# Patient Record
Sex: MALE | Race: WHITE | URBAN - METROPOLITAN AREA
[De-identification: names, ages, dates, MRNs, and addresses within clinical notes are randomized per-mention and may not be internally consistent; named-entity substitution may affect disease eponyms.]

---

## 2018-11-19 ENCOUNTER — HOSPITAL ENCOUNTER (OUTPATIENT)
Dept: LAB | Age: 43
Discharge: HOME | End: 2018-11-19
Payer: OTHER GOVERNMENT

## 2018-11-19 ENCOUNTER — OFFICE VISIT (OUTPATIENT)
Dept: FAMILY MEDICINE CLINIC | Age: 43
End: 2018-11-19

## 2018-11-19 VITALS
TEMPERATURE: 97.5 F | SYSTOLIC BLOOD PRESSURE: 126 MMHG | WEIGHT: 163.4 LBS | HEIGHT: 67 IN | OXYGEN SATURATION: 98 % | BODY MASS INDEX: 25.65 KG/M2 | RESPIRATION RATE: 16 BRPM | DIASTOLIC BLOOD PRESSURE: 72 MMHG | HEART RATE: 70 BPM

## 2018-11-19 DIAGNOSIS — R53.83 FATIGUE, UNSPECIFIED TYPE: ICD-10-CM

## 2018-11-19 DIAGNOSIS — I10 ESSENTIAL HYPERTENSION: ICD-10-CM

## 2018-11-19 DIAGNOSIS — I10 ESSENTIAL HYPERTENSION: Primary | ICD-10-CM

## 2018-11-19 LAB
ALBUMIN SERPL-MCNC: 4.8 G/DL (ref 3.4–5)
ALBUMIN/GLOB SERPL: 1.5 {RATIO} (ref 0.8–1.7)
ALP SERPL-CCNC: 92 U/L (ref 45–117)
ALT SERPL-CCNC: 41 U/L (ref 16–61)
ANION GAP SERPL CALC-SCNC: 10 MMOL/L (ref 3–18)
AST SERPL-CCNC: 24 U/L (ref 15–37)
BASOPHILS # BLD: 0 K/UL (ref 0–0.1)
BASOPHILS NFR BLD: 0 % (ref 0–2)
BILIRUB SERPL-MCNC: 0.7 MG/DL (ref 0.2–1)
BUN SERPL-MCNC: 16 MG/DL (ref 7–18)
BUN/CREAT SERPL: 15 (ref 12–20)
CALCIUM SERPL-MCNC: 9.2 MG/DL (ref 8.5–10.1)
CHLORIDE SERPL-SCNC: 101 MMOL/L (ref 100–108)
CHOLEST SERPL-MCNC: 268 MG/DL
CO2 SERPL-SCNC: 27 MMOL/L (ref 21–32)
CREAT SERPL-MCNC: 1.05 MG/DL (ref 0.6–1.3)
DIFFERENTIAL METHOD BLD: ABNORMAL
EOSINOPHIL # BLD: 0.1 K/UL (ref 0–0.4)
EOSINOPHIL NFR BLD: 1 % (ref 0–5)
ERYTHROCYTE [DISTWIDTH] IN BLOOD BY AUTOMATED COUNT: 12.1 % (ref 11.6–14.5)
GLOBULIN SER CALC-MCNC: 3.1 G/DL (ref 2–4)
GLUCOSE SERPL-MCNC: 73 MG/DL (ref 74–99)
HCT VFR BLD AUTO: 47.8 % (ref 36–48)
HDLC SERPL-MCNC: 63 MG/DL (ref 40–60)
HDLC SERPL: 4.3 {RATIO} (ref 0–5)
HGB BLD-MCNC: 16.3 G/DL (ref 13–16)
LDLC SERPL CALC-MCNC: 175.8 MG/DL (ref 0–100)
LIPID PROFILE,FLP: ABNORMAL
LYMPHOCYTES # BLD: 2.2 K/UL (ref 0.9–3.6)
LYMPHOCYTES NFR BLD: 20 % (ref 21–52)
MCH RBC QN AUTO: 31.5 PG (ref 24–34)
MCHC RBC AUTO-ENTMCNC: 34.1 G/DL (ref 31–37)
MCV RBC AUTO: 92.3 FL (ref 74–97)
MONOCYTES # BLD: 0.8 K/UL (ref 0.05–1.2)
MONOCYTES NFR BLD: 7 % (ref 3–10)
NEUTS SEG # BLD: 8 K/UL (ref 1.8–8)
NEUTS SEG NFR BLD: 72 % (ref 40–73)
PLATELET # BLD AUTO: 217 K/UL (ref 135–420)
PMV BLD AUTO: 9.5 FL (ref 9.2–11.8)
POTASSIUM SERPL-SCNC: 4.1 MMOL/L (ref 3.5–5.5)
PROT SERPL-MCNC: 7.9 G/DL (ref 6.4–8.2)
RBC # BLD AUTO: 5.18 M/UL (ref 4.7–5.5)
SODIUM SERPL-SCNC: 138 MMOL/L (ref 136–145)
TRIGL SERPL-MCNC: 146 MG/DL (ref ?–150)
VLDLC SERPL CALC-MCNC: 29.2 MG/DL
WBC # BLD AUTO: 11 K/UL (ref 4.6–13.2)

## 2018-11-19 PROCEDURE — 80053 COMPREHEN METABOLIC PANEL: CPT

## 2018-11-19 PROCEDURE — 36415 COLL VENOUS BLD VENIPUNCTURE: CPT

## 2018-11-19 PROCEDURE — 85025 COMPLETE CBC W/AUTO DIFF WBC: CPT

## 2018-11-19 PROCEDURE — 82306 VITAMIN D 25 HYDROXY: CPT

## 2018-11-19 PROCEDURE — 82043 UR ALBUMIN QUANTITATIVE: CPT

## 2018-11-19 PROCEDURE — 80061 LIPID PANEL: CPT

## 2018-11-19 RX ORDER — LISINOPRIL 20 MG/1
TABLET ORAL DAILY
COMMUNITY
End: 2018-11-19 | Stop reason: SDUPTHER

## 2018-11-19 RX ORDER — LISINOPRIL 20 MG/1
20 TABLET ORAL DAILY
Qty: 90 TAB | Refills: 1 | Status: SHIPPED | OUTPATIENT
Start: 2018-11-19 | End: 2019-07-19 | Stop reason: SDUPTHER

## 2018-11-19 NOTE — PROGRESS NOTES
[address] CC: EOC for HTN 
 
HPI:  
 
HTN: 
- Has been on medication since around 2013 
- Taking lisinopril as prescribed - Denies any issues or side effects from his medication 
- Occasionally checks BP at home - Reports that BP has been in 120s/70s - Exercising 3 days a week for 30-45 minutes - Trying to minimize sodium in diet - Lipids last checked on May 25, 2018. Record brought in for review. - Had a T. Chol. of 241, HDL of 57, and LDL of  
 
 
ROS: Positive items marked in RED 
CON: fever, chills, fatigue (endorses having stress and poor sleep & mood) Cardiovascular: palpitations, CP Resp: SOB, cough GI: nausea, vomiting, diarrhea, melena, hematochezia : dysuria, hematuria Past Medical History:  
Diagnosis Date  Biceps tendon rupture, right, initial encounter 2017  Dyslipidemia  Hypertension Past Surgical History:  
Procedure Laterality Date Mac Valdezkeya [address] ORTHOPAEDIC Right 2017 Tendon rupture arm [address] [facility] Family History Problem Relation Age of Onset  Diabetes Father  Hypertension Father  High Cholesterol Father Social History Socioeconomic History  Marital status:  Spouse name: Not on file  Number of children: Not on file  Years of education: Not on file  Highest education level: Not on file Social Needs  Financial resource strain: Not on file  Food insecurity - worry: Not on file  Food insecurity - inability: Not on file  Transportation needs - medical: Not on file  Transportation needs - non-medical: Not on file Occupational History  Occupation: RN  
  Comment: Occupational nurse for [employer] Tobacco Use  Smoking status: Never Smoker  Smokeless tobacco: Never Used Substance and Sexual Activity  Alcohol use: Yes Alcohol/week: 1.2 oz Types: 2 Cans of beer per week  Drug use: No  
 Sexual activity: Yes  
  Partners: Female Birth control/protection: None Other Topics Concern  Not on file Social History Narrative  Not on file No Known Allergies Current Outpatient Medications:  
  lisinopril (PRINIVIL, ZESTRIL) 20 mg tablet, Take  by mouth daily. , Disp: , Rfl:  
 
Physical Exam:  
  
/72   Pulse 70   Temp 97.5 °F (36.4 °C) (Oral)   Resp 16   Ht 5' 7\" (1.702 m)   Wt 163 lb 6.4 oz (74.1 kg)   SpO2 98%   BMI 25.59 kg/m² General:  WD, WN, NAD, conversant Eyes: sclera clear bilaterally, no discharge noted, eyelids normal in appearance HENT: NCAT Lungs: CTAB, normal respiratory effort and rate CV: RRR, no MRGs ABD: soft, non-tender, non-distended, normal bowel sounds Ext: no peripheral edema or digital cyanosis noted Skin: normal temperature, turgor, color, and texture Psych: alert and oriented to person, place and situation, normal affect Neuro: speech normal, moving all extremities, gait normal 
 
 
Assessment/Plan HTN, Well Controlled: 
- 10 year ASCVD risk calculated to be 1.9% based on lipid panel from 5/25/2018 - Will continue current BP regimen - Will check fasting lipid panel, CBC, CMP, and urine microalbumin/Cr 
- F/U in one month for fatigue Fatigue: - Likely due to poor sleep/mood and stress - Will start work up with CBC, CMP, and vitamin D level 
- Handout given on fatigue care - F/U in one month for fatigue Shruthi Vazquez MD 
11/19/2018, 1:12 PM

## 2018-11-19 NOTE — PROGRESS NOTES
Patient states: 
- \" [facility] as [facility] pharmacy\" -Needs refills for lisinopril 20 mg. 
-Flu shot done in [facility] on October. 
-He will updateT-dap vaccine that received, but he doesn't -remember when. Chief Complaint Patient presents with Erma Borja Establish Care 1. Have you been to the ER, urgent care clinic since your last visit? Hospitalized since your last visit? No 
 
2. Have you seen or consulted any other health care providers outside of the [address] [place] [address] since your last visit? Include any pap smears or colon screening. No  
 
Visit Vitals /72 Pulse 70 Temp 97.5 °F (36.4 °C) (Oral) Resp 16 Ht 5' 7\" (1.702 m) Wt 163 lb 6.4 oz (74.1 kg) SpO2 98% BMI 25.59 kg/m²

## 2018-11-19 NOTE — PATIENT INSTRUCTIONS
Fatigue: Care Instructions Your Care Instructions Fatigue is a feeling of tiredness, exhaustion, or lack of energy. You may feel fatigue because of too much or not enough activity. It can also come from stress, lack of sleep, boredom, and poor diet. Many medical problems, such as viral infections, can cause fatigue. Emotional problems, especially depression, are often the cause of fatigue. Fatigue is most often a symptom of another problem. Treatment for fatigue depends on the cause. For example, if you have fatigue because you have a certain health problem, treating this problem also treats your fatigue. If depression or anxiety is the cause, treatment may help. Follow-up care is a key part of your treatment and safety. Be sure to make and go to all appointments, and call your doctor if you are having problems. It's also a good idea to know your test results and keep a list of the medicines you take. How can you care for yourself at home? · Get regular exercise. But don't overdo it. Go back and forth between rest and exercise. · Get plenty of rest. 
· Eat a healthy diet. Do not skip meals, especially breakfast. 
· Reduce your use of caffeine, tobacco, and alcohol. Caffeine is most often found in coffee, tea, cola drinks, and chocolate. · Limit medicines that can cause fatigue. This includes tranquilizers and cold and allergy medicines. When should you call for help? Watch closely for changes in your health, and be sure to contact your doctor if: 
  · You have new symptoms such as fever or a rash.  
  · Your fatigue gets worse.  
  · You have been feeling down, depressed, or hopeless. Or you may have lost interest in things that you usually enjoy.  
  · You are not getting better as expected. Where can you learn more? Go to http://rene-raman.info/. Enter L460 in the search box to learn more about \"Fatigue: Care Instructions. \" Current as of: November 20, 2017 Content Version: 11.8 © 8008-0066 Healthwise, Incorporated. Care instructions adapted under license by [employer] (which disclaims liability or warranty for this information). If you have questions about a medical condition or this instruction, always ask your healthcare professional. [address] any warranty or liability for your use of this information.

## 2018-11-20 LAB
25(OH)D3 SERPL-MCNC: 22.8 NG/ML (ref 30–100)
CREAT UR-MCNC: 40 MG/DL (ref 30–125)
MICROALBUMIN UR-MCNC: <0.5 MG/DL (ref 0–3)
MICROALBUMIN/CREAT UR-RTO: NORMAL MG/G (ref 0–30)

## 2018-11-25 DIAGNOSIS — E55.9 VITAMIN D DEFICIENCY: Primary | ICD-10-CM

## 2018-11-25 RX ORDER — CYANOCOBALAMIN (VITAMIN B-12) 500 MCG
800 TABLET ORAL DAILY
Qty: 180 TAB | Refills: 0 | Status: SHIPPED | OUTPATIENT
Start: 2018-11-25 | End: 2018-11-26 | Stop reason: SDUPTHER

## 2018-11-26 DIAGNOSIS — E55.9 VITAMIN D DEFICIENCY: ICD-10-CM

## 2018-11-26 RX ORDER — CYANOCOBALAMIN (VITAMIN B-12) 500 MCG
800 TABLET ORAL DAILY
Qty: 180 TAB | Refills: 0 | Status: SHIPPED | OUTPATIENT
Start: 2018-11-26 | End: 2019-02-24

## 2018-12-19 ENCOUNTER — OFFICE VISIT (OUTPATIENT)
Dept: FAMILY MEDICINE CLINIC | Age: 43
End: 2018-12-19

## 2018-12-19 VITALS
TEMPERATURE: 98.4 F | BODY MASS INDEX: 25.52 KG/M2 | RESPIRATION RATE: 17 BRPM | HEIGHT: 67 IN | SYSTOLIC BLOOD PRESSURE: 120 MMHG | DIASTOLIC BLOOD PRESSURE: 79 MMHG | HEART RATE: 82 BPM | WEIGHT: 162.6 LBS

## 2018-12-19 DIAGNOSIS — E78.5 HYPERLIPIDEMIA, UNSPECIFIED HYPERLIPIDEMIA TYPE: ICD-10-CM

## 2018-12-19 DIAGNOSIS — R53.83 FATIGUE, UNSPECIFIED TYPE: Primary | ICD-10-CM

## 2018-12-19 NOTE — PROGRESS NOTES
[facility]    CC: F/U of fatigue and lab work    HPI:     -Papi Pham requested blood work   -Taking OTC vitamin D as prescribed  -Reports the fatigue has improved notably from last visit   -Currently exercising 3 days a week for 45-60 minutes  -Reports trying to eat lean meats, has increased vegetables in diet, and has reduced his sugar intake      ROS: Positive items marked in RED  CON: fever, chills  Cardiovascular: palpitations, CP  Resp: SOB, cough  GI: nausea, vomiting, diarrhea  : dysuria, hematuria      Past Medical History:   Diagnosis Date    Biceps tendon rupture, right, initial encounter 2017    Dyslipidemia     Hypertension        Past Surgical History:   Procedure Laterality Date    HX HERNIA REPAIR  1976    HX ORTHOPAEDIC Right 2017    Tendon rupture arm    HX TONSIL AND ADENOIDECTOMY  1985    HX WISDOM TEETH EXTRACTION  1992       Family History   Problem Relation Age of Onset    Diabetes Father     Hypertension Father     High Cholesterol Father        Social History     Socioeconomic History    Marital status:      Spouse name: Not on file    Number of children: Not on file    Years of education: Not on file    Highest education level: Not on file   Occupational History    Occupation: RN     Comment: Occupational nurse for [employer]   Tobacco Use    Smoking status: Never Smoker    Smokeless tobacco: Never Used   Substance and Sexual Activity    Alcohol use: Yes     Alcohol/week: 1.2 oz     Types: 2 Cans of beer per week    Drug use: No    Sexual activity: Yes     Partners: Female     Birth control/protection: None       No Known Allergies      Current Outpatient Medications:     cholecalciferol (VITAMIN D3) 400 unit tab tablet, Take 2 Tabs by mouth daily for 90 days. , Disp: 180 Tab, Rfl: 0    lisinopril (PRINIVIL, ZESTRIL) 20 mg tablet, Take 1 Tab by mouth daily. , Disp: 90 Tab, Rfl: 1    Physical Exam:      /79   Pulse 82   Temp 98.4 °F (36.9 °C) (Oral)   Resp 17   Ht 5' 7\" (1.702 m)   Wt 162 lb 9.6 oz (73.8 kg)   BMI 25.47 kg/m²     General: WD, WN, NAD, conversant  Eyes: sclera clear bilaterally, no discharge noted, eyelids normal in appearance  HENT: NCAT  Lungs: CTAB, normal respiratory effort and rate  CV: RRR, no MRGs  ABD: soft, non-tender, non-distended, normal bowel sounds  Skin: normal temperature, turgor, color, and texture  Psych: alert and oriented to person, place and situation, normal affect  Neuro: speech normal, moving all extremities, gait normal    Results for Carlos Alberto Domingo (MRN [id number]):   Ref. Range 11/19/2018 13:45   WBC Latest Ref Range: 4.6 - 13.2 K/uL 11.0   RBC Latest Ref Range: 4.70 - 5.50 M/uL 5.18   HGB Latest Ref Range: 13.0 - 16.0 g/dL 16.3 (H)   HCT Latest Ref Range: 36.0 - 48.0 % 47.8   MCV Latest Ref Range: 74.0 - 97.0 FL 92.3   MCH Latest Ref Range: 24.0 - 34.0 PG 31.5   MCHC Latest Ref Range: 31.0 - 37.0 g/dL 34.1   RDW Latest Ref Range: 11.6 - 14.5 % 12.1   PLATELET Latest Ref Range: 135 - 420 K/uL 217   MPV Latest Ref Range: 9.2 - 11.8 FL 9.5   NEUTROPHILS Latest Ref Range: 40 - 73 % 72   LYMPHOCYTES Latest Ref Range: 21 - 52 % 20 (L)   MONOCYTES Latest Ref Range: 3 - 10 % 7   EOSINOPHILS Latest Ref Range: 0 - 5 % 1   BASOPHILS Latest Ref Range: 0 - 2 % 0   DF Latest Units:   AUTOMATED   ABS. NEUTROPHILS Latest Ref Range: 1.8 - 8.0 K/UL 8.0   ABS. LYMPHOCYTES Latest Ref Range: 0.9 - 3.6 K/UL 2.2   ABS. MONOCYTES Latest Ref Range: 0.05 - 1.2 K/UL 0.8   ABS. EOSINOPHILS Latest Ref Range: 0.0 - 0.4 K/UL 0.1   ABS.  BASOPHILS Latest Ref Range: 0.0 - 0.1 K/UL 0.0   Sodium Latest Ref Range: 136 - 145 mmol/L 138   Potassium Latest Ref Range: 3.5 - 5.5 mmol/L 4.1   Chloride Latest Ref Range: 100 - 108 mmol/L 101   CO2 Latest Ref Range: 21 - 32 mmol/L 27   Anion gap Latest Ref Range: 3.0 - 18 mmol/L 10   Glucose Latest Ref Range: 74 - 99 mg/dL 73 (L)   BUN Latest Ref Range: 7.0 - 18 MG/DL 16   Creatinine Latest Ref Range: 0.6 - 1.3 MG/DL 1.05   BUN/Creatinine ratio Latest Ref Range: 12 - 20   15   Calcium Latest Ref Range: 8.5 - 10.1 MG/DL 9.2   GFR est non-AA Latest Ref Range: >60 ml/min/1.73m2 >60   GFR est AA Latest Ref Range: >60 ml/min/1.73m2 >60   Bilirubin, total Latest Ref Range: 0.2 - 1.0 MG/DL 0.7   Protein, total Latest Ref Range: 6.4 - 8.2 g/dL 7.9   Albumin Latest Ref Range: 3.4 - 5.0 g/dL 4.8   Globulin Latest Ref Range: 2.0 - 4.0 g/dL 3.1   A-G Ratio Latest Ref Range: 0.8 - 1.7   1.5   ALT (SGPT) Latest Ref Range: 16 - 61 U/L 41   AST Latest Ref Range: 15 - 37 U/L 24   Alk. phosphatase Latest Ref Range: 45 - 117 U/L 92   Triglyceride Latest Ref Range: <150 MG/   Cholesterol, total Latest Ref Range: <200 MG/ (H)   HDL Cholesterol Latest Ref Range: 40 - 60 MG/DL 63 (H)   CHOL/HDL Ratio Latest Ref Range: 0 - 5.0   4.3   LDL, calculated Latest Ref Range: 0 - 100 MG/.8 (H)   VLDL, calculated Latest Units: MG/DL 29.2   Creatinine, urine Latest Ref Range: 30 - 125 mg/dL 40.00   Microalbumin,urine random Latest Ref Range: 0 - 3.0 MG/DL <0.50   Microalbumin/Creat. Ratio Latest Ref Range: 0 - 30 mg/g Cannot calculate . ..    Vitamin D 25-Hydroxy Latest Ref Range: 30 - 100 ng/mL 22.8 (L)       Assessment/Plan     Fatigue, Improving:  -Suspect it is secondary to vitamin D deficiency  -Will continue current vitamin D regimen  -We will defer doing any further workup, given improvement since starting vitamin D  - Follow up as needed      HLD, Unchanged:  -Current 10-year ASCVD risk calculated to be 1.9%  -Calculated ASCVD risk and current AHA/ACC recommendations discussed with patient  -Will continue to manage with lifestyle changes  -Follow-up in 7 months for well visit        Leeann Bolden MD  12/19/2018, 3:34 PM

## 2018-12-19 NOTE — PROGRESS NOTES
1. Have you been to the ER, urgent care clinic since your last visit? Hospitalized since your last visit? No.     2. Have you seen or consulted any other health care providers outside of the [address] [place] [address] since your last visit? Include any pap smears or colon screening.  No.     Chief Complaint   Patient presents with    Follow Up Chronic Condition    Fatigue     Better since last visit    Results     labs

## 2018-12-19 NOTE — PATIENT INSTRUCTIONS
Learning About High Cholesterol  What is high cholesterol? Cholesterol is a type of fat in your blood. It is needed for many body functions, such as making new cells. Cholesterol is made by your body. It also comes from food you eat. If you have too much cholesterol, it starts to build up in your arteries. This is called hardening of the arteries, or atherosclerosis. High cholesterol raises your risk of a heart attack and stroke. There are different types of cholesterol. LDL is the \"bad\" cholesterol. High LDL can raise your risk for heart disease, heart attack, and stroke. HDL is the \"good\" cholesterol. High HDL is linked with a lower risk for heart disease, heart attack, and stroke. Your cholesterol levels help your doctor find out your risk for having a heart attack or stroke. How can you prevent high cholesterol? A heart-healthy lifestyle can help you prevent high cholesterol. This lifestyle helps lower your risk for a heart attack and stroke. · Eat heart-healthy foods. ? Eat fruits, vegetables, whole grains (like oatmeal), dried beans and peas, nuts and seeds, soy products (like tofu), and fat-free or low-fat dairy products. ? Replace butter, margarine, and hydrogenated or partially hydrogenated oils with olive and canola oils. (Canola oil margarine without trans fat is fine.)  ? Replace red meat with fish, poultry, and soy protein (like tofu). ? Limit processed and packaged foods like chips, crackers, and cookies. · Be active. Exercise can improve your cholesterol level. Get at least 30 minutes of exercise on most days of the week. Walking is a good choice. You also may want to do other activities, such as running, swimming, cycling, or playing tennis or team sports. · Stay at a healthy weight. Lose weight if you need to. · Don't smoke. If you need help quitting, talk to your doctor about stop-smoking programs and medicines. These can increase your chances of quitting for good.   How is high cholesterol treated? The goal of treatment is to reduce your chances of having a heart attack or stroke. The goal is not to lower your cholesterol numbers only. · You may make lifestyle changes, such as eating healthy foods, not smoking, losing weight, and being more active. · You may have to take medicine. Follow-up care is a key part of your treatment and safety. Be sure to make and go to all appointments, and call your doctor if you are having problems. It's also a good idea to know your test results and keep a list of the medicines you take. Where can you learn more? Go to http://rene-raman.info/. Enter E724 in the search box to learn more about \"Learning About High Cholesterol. \"  Current as of: December 6, 2017  Content Version: 11.8  © 0989-0119 Healthwise, Incorporated. Care instructions adapted under license by [employer] (which disclaims liability or warranty for this information). If you have questions about a medical condition or this instruction, always ask your healthcare professional. [address] any warranty or liability for your use of this information.

## 2019-07-19 ENCOUNTER — OFFICE VISIT (OUTPATIENT)
Dept: FAMILY MEDICINE CLINIC | Age: 44
End: 2019-07-19

## 2019-07-19 VITALS
WEIGHT: 167 LBS | HEART RATE: 75 BPM | TEMPERATURE: 96.8 F | DIASTOLIC BLOOD PRESSURE: 82 MMHG | HEIGHT: 67 IN | RESPIRATION RATE: 18 BRPM | SYSTOLIC BLOOD PRESSURE: 138 MMHG | BODY MASS INDEX: 26.21 KG/M2 | OXYGEN SATURATION: 98 %

## 2019-07-19 DIAGNOSIS — I10 ESSENTIAL HYPERTENSION: ICD-10-CM

## 2019-07-19 DIAGNOSIS — Z00.00 VISIT FOR WELL MAN HEALTH CHECK: Primary | ICD-10-CM

## 2019-07-19 DIAGNOSIS — E55.9 VITAMIN D DEFICIENCY: ICD-10-CM

## 2019-07-19 RX ORDER — GLUCOSAMINE SULFATE 1500 MG
POWDER IN PACKET (EA) ORAL DAILY
COMMUNITY

## 2019-07-19 RX ORDER — LISINOPRIL 20 MG/1
20 TABLET ORAL DAILY
Qty: 90 TAB | Refills: 1 | Status: SHIPPED | OUTPATIENT
Start: 2019-07-19 | End: 2020-04-27

## 2019-07-19 NOTE — LETTER
NOTIFICATION RETURN TO WORK / SCHOOL 
 
7/19/2019 3:43 PM 
 
Mr. Magnus Angelucci [address] [facility] [address] To Whom It May Concern: 
 
Magnus Angelucci is currently under the care of Misa Diez. He needs a sit to stand desk to allow him to change positions. If there are questions or concerns please have the patient contact our office. Sincerely, Ovidio Valdivia MD

## 2019-07-19 NOTE — PROGRESS NOTES
[facility]    CC: Well Man Exam    HPI:     Well Man Visit:  - 44 y.o.    - Sexual Hx: Active with wife only  - STD Hx: None  - Sun exposure: Yes. Using any protection: Sunscreen, hats, and glasses  - Vitamins or supplement: Vitamin D3  - Other melanie providers seen: Dentist  - Physical activity: Exercises 4 days a week for 30-60 minutes  - Diet: Unrestricted, but snaking less, dairy: Yes  - Caffeine intake: once a week he will drink a cup of coffee  - Safety: Wears seatbelt in car and has smoke detectors at home  - Reports he feels safe at home and in his neighborhood. Reports he feels his family is safe at home and in his neighborhood.        ROS: Positive items marked in RED  CON: fever, chills  Eyes: vision loss, eye pain  ENT: ear pain, sore throat  Cardiovascular: CP, palpitations, swelling of lower extremities  Resp: SOB, cough  Lymph/Heme: swollen/enlarged lymph nodes, tender/painful lymph nodes  GI: nausea, vomiting, diarrhea, melena, hematochezia  SKIN: rash, itching,  growths  : dysuria, hematuria, abnormal penile discharge, testicular pain/lumps/bumps  MS: arthralgias, myalgias  Neuro: numbness, weakness  Psych: depression, anxiety    Past Medical History:   Diagnosis Date    Biceps tendon rupture, right, initial encounter 2017    Dyslipidemia     Hypertension        Past Surgical History:   Procedure Laterality Date    HX HERNIA REPAIR  1976    HX ORTHOPAEDIC Right 2017    Tendon rupture arm    HX TONSIL AND ADENOIDECTOMY  1985    HX WISDOM TEETH EXTRACTION  1992       Family History   Problem Relation Age of Onset    Diabetes Father     Hypertension Father     High Cholesterol Father        Social History     Socioeconomic History    Marital status:      Spouse name: Not on file    Number of children: Not on file    Years of education: Not on file    Highest education level: Not on file   Occupational History    Occupation: RN     Comment: Occupational nurse for [employer]   Tobacco Use    Smoking status: Never Smoker    Smokeless tobacco: Never Used   Substance and Sexual Activity    Alcohol use: Yes     Alcohol/week: 2.0 standard drinks     Types: 2 Cans of beer per week    Drug use: No    Sexual activity: Yes     Partners: Female     Birth control/protection: None       No Known Allergies      Current Outpatient Medications:     cholecalciferol (VITAMIN D3) 1,000 unit cap, Take  by mouth daily. , Disp: , Rfl:     lisinopril (PRINIVIL, ZESTRIL) 20 mg tablet, Take 1 Tab by mouth daily. , Disp: 90 Tab, Rfl: 1    Physical Exam:      /82   Pulse 75   Temp 96.8 °F (36 °C) (Oral)   Resp 18   Ht 5' 7\" (1.702 m)   Wt 167 lb (75.8 kg)   SpO2 98%   BMI 26.16 kg/m²     General:  WD, WN, NAD, conversant  Eyes: sclera clear bilaterally, no discharge noted, eyelids normal in appearance  HENT: NCAT, oropharynx clear, MMM  Neck: supple, midline trachea  Lungs: CTAB, Normal respiratory effort and rate  CV: RRR, no MRGs  ABD: soft, non-tender, non-distended, normal bowel sounds  : normal appearing genitalia, no penile discharge noted, cremaster reflex intact bilaterally, no hernias  Ext: no peripheral edema or digital cyanosis noted  Skin: normal temperature, turgor, color, and texture  Psych: alert and oriented to person, place and time, normal affect  Neuro: speech normal, moving all extremities, gait normal, 5/5 strength in upper/lower extremities, CN II-XII grossly intact      Assessment/Plan     Well Man Visit:  -Patient counseled on lifestyle changes to promote BP control  -Vitamin D level, CBC, CMP, fasting lipid panel ordered  -Handouts given on well visit care, DASH diet, hypertension care, and reading food labels for sodium content  -Follow-up in 2 weeks for hypertension management        Miriam Zaman MD  7/19/2019, 3:20 PM

## 2019-07-19 NOTE — PROGRESS NOTES
1. Have you been to the ER, urgent care clinic since your last visit? Hospitalized since your last visit? No.     2. Have you seen or consulted any other health care providers outside of the [address] [place] [address] since your last visit? Include any pap smears or colon screening.  No.     Chief Complaint   Patient presents with   [facility] Wellness Visit

## 2019-07-19 NOTE — PATIENT INSTRUCTIONS
Well Visit, Ages 25 to 48: Care Instructions  Your Care Instructions    Physical exams can help you stay healthy. Your doctor has checked your overall health and may have suggested ways to take good care of yourself. He or she also may have recommended tests. At home, you can help prevent illness with healthy eating, regular exercise, and other steps. Follow-up care is a key part of your treatment and safety. Be sure to make and go to all appointments, and call your doctor if you are having problems. It's also a good idea to know your test results and keep a list of the medicines you take. How can you care for yourself at home? · Reach and stay at a healthy weight. This will lower your risk for many problems, such as obesity, diabetes, heart disease, and high blood pressure. · Get at least 30 minutes of physical activity on most days of the week. Walking is a good choice. You also may want to do other activities, such as running, swimming, cycling, or playing tennis or team sports. Discuss any changes in your exercise program with your doctor. · Do not smoke or allow others to smoke around you. If you need help quitting, talk to your doctor about stop-smoking programs and medicines. These can increase your chances of quitting for good. · Talk to your doctor about whether you have any risk factors for sexually transmitted infections (STIs). Having one sex partner (who does not have STIs and does not have sex with anyone else) is a good way to avoid these infections. · Use birth control if you do not want to have children at this time. Talk with your doctor about the choices available and what might be best for you. · Protect your skin from too much sun. When you're outdoors from 10 a.m. to 4 p.m., stay in the shade or cover up with clothing and a hat with a wide brim. Wear sunglasses that block UV rays. Even when it's cloudy, put broad-spectrum sunscreen (SPF 30 or higher) on any exposed skin.   · See a dentist one or two times a year for checkups and to have your teeth cleaned. · Wear a seat belt in the car. · Drink alcohol in moderation, if at all. That means no more than 2 drinks a day for men and 1 drink a day for women. Follow your doctor's advice about when to have certain tests. These tests can spot problems early. For everyone  · Cholesterol. Have the fat (cholesterol) in your blood tested after age 21. Your doctor will tell you how often to have this done based on your age, family history, or other things that can increase your risk for heart disease. · Blood pressure. Have your blood pressure checked during a routine doctor visit. Your doctor will tell you how often to check your blood pressure based on your age, your blood pressure results, and other factors. · Vision. Talk with your doctor about how often to have a glaucoma test.  · Diabetes. Ask your doctor whether you should have tests for diabetes. · Colon cancer. Have a test for colon cancer at age 48. You may have one of several tests. If you are younger than 48, you may need a test earlier if you have any risk factors. Risk factors include whether you already had a precancerous polyp removed from your colon or whether your parent, brother, sister, or child has had colon cancer. For women  · Breast exam and mammogram. Talk to your doctor about when you should have a clinical breast exam and a mammogram. Medical experts differ on whether and how often women under 50 should have these tests. Your doctor can help you decide what is right for you. · Pap test and pelvic exam. Begin Pap tests at age 24. A Pap test is the best way to find cervical cancer. The test often is part of a pelvic exam. Ask how often to have this test.  · Tests for sexually transmitted infections (STIs). Ask whether you should have tests for STIs. You may be at risk if you have sex with more than one person, especially if your partners do not wear condoms.   For men  · Tests for sexually transmitted infections (STIs). Ask whether you should have tests for STIs. You may be at risk if you have sex with more than one person, especially if you do not wear a condom. · Testicular cancer exam. Ask your doctor whether you should check your testicles regularly. · Prostate exam. Talk to your doctor about whether you should have a blood test (called a PSA test) for prostate cancer. Experts differ on whether and when men should have this test. Some experts suggest it if you are older than 39 and are -American or have a father or brother who got prostate cancer when he was younger than 72. When should you call for help? Watch closely for changes in your health, and be sure to contact your doctor if you have any problems or symptoms that concern you. Where can you learn more? Go to http://rene-raman.info/. Enter P072 in the search box to learn more about \"Well Visit, Ages 25 to 48: Care Instructions. \"  Current as of: March 28, 2018  Content Version: 11.9  © 7836-4265 [employer]. Care instructions adapted under license by [employer] (which disclaims liability or warranty for this information). If you have questions about a medical condition or this instruction, always ask your healthcare professional. [place] [address] any warranty or liability for your use of this information. DASH Diet: Care Instructions  Your Care Instructions    The DASH diet is an eating plan that can help lower your blood pressure. DASH stands for Dietary Approaches to Stop Hypertension. Hypertension is high blood pressure. The DASH diet focuses on eating foods that are high in calcium, potassium, and magnesium. These nutrients can lower blood pressure. The foods that are highest in these nutrients are fruits, vegetables, low-fat dairy products, nuts, seeds, and legumes.  But taking calcium, potassium, and magnesium supplements instead of eating foods that are high in those nutrients does not have the same effect. The DASH diet also includes whole grains, fish, and poultry. The DASH diet is one of several lifestyle changes your doctor may recommend to lower your high blood pressure. Your doctor may also want you to decrease the amount of sodium in your diet. Lowering sodium while following the DASH diet can lower blood pressure even further than just the DASH diet alone. Follow-up care is a key part of your treatment and safety. Be sure to make and go to all appointments, and call your doctor if you are having problems. It's also a good idea to know your test results and keep a list of the medicines you take. How can you care for yourself at home? Following the DASH diet  · Eat 4 to 5 servings of fruit each day. A serving is 1 medium-sized piece of fruit, ½ cup chopped or canned fruit, 1/4 cup dried fruit, or 4 ounces (½ cup) of fruit juice. Choose fruit more often than fruit juice. · Eat 4 to 5 servings of vegetables each day. A serving is 1 cup of lettuce or raw leafy vegetables, ½ cup of chopped or cooked vegetables, or 4 ounces (½ cup) of vegetable juice. Choose vegetables more often than vegetable juice. · Get 2 to 3 servings of low-fat and fat-free dairy each day. A serving is 8 ounces of milk, 1 cup of yogurt, or 1 ½ ounces of cheese. · Eat 6 to 8 servings of grains each day. A serving is 1 slice of bread, 1 ounce of dry cereal, or ½ cup of cooked rice, pasta, or cooked cereal. Try to choose whole-grain products as much as possible. · Limit lean meat, poultry, and fish to 2 servings each day. A serving is 3 ounces, about the size of a deck of cards. · Eat 4 to 5 servings of nuts, seeds, and legumes (cooked dried beans, lentils, and split peas) each week. A serving is 1/3 cup of nuts, 2 tablespoons of seeds, or ½ cup of cooked beans or peas. · Limit fats and oils to 2 to 3 servings each day.  A serving is 1 teaspoon of vegetable oil or 2 tablespoons of salad dressing. · Limit sweets and added sugars to 5 servings or less a week. A serving is 1 tablespoon jelly or jam, ½ cup sorbet, or 1 cup of lemonade. · Eat less than 2,300 milligrams (mg) of sodium a day. If you limit your sodium to 1,500 mg a day, you can lower your blood pressure even more. Tips for success  · Start small. Do not try to make dramatic changes to your diet all at once. You might feel that you are missing out on your favorite foods and then be more likely to not follow the plan. Make small changes, and stick with them. Once those changes become habit, add a few more changes. · Try some of the following:  ? Make it a goal to eat a fruit or vegetable at every meal and at snacks. This will make it easy to get the recommended amount of fruits and vegetables each day. ? Try yogurt topped with fruit and nuts for a snack or healthy dessert. ? Add lettuce, tomato, cucumber, and onion to sandwiches. ? Combine a ready-made pizza crust with low-fat mozzarella cheese and lots of vegetable toppings. Try using tomatoes, squash, spinach, broccoli, carrots, cauliflower, and onions. ? Have a variety of cut-up vegetables with a low-fat dip as an appetizer instead of chips and dip. ? Sprinkle sunflower seeds or chopped almonds over salads. Or try adding chopped walnuts or almonds to cooked vegetables. ? Try some vegetarian meals using beans and peas. Add garbanzo or kidney beans to salads. Make burritos and tacos with mashed sommers beans or black beans. Where can you learn more? Go to http://rene-raman.info/. Enter X608 in the search box to learn more about \"DASH Diet: Care Instructions. \"  Current as of: July 22, 2018  Content Version: 11.9  © 5455-4579 [employer]. Care instructions adapted under license by [employer] (which disclaims liability or warranty for this information).  If you have questions about a medical condition or this instruction, always ask your healthcare professional. [address] any warranty or liability for your use of this information. High Blood Pressure: Care Instructions  Overview    It's normal for blood pressure to go up and down throughout the day. But if it stays up, you have high blood pressure. Another name for high blood pressure is hypertension. Despite what a lot of people think, high blood pressure usually doesn't cause headaches or make you feel dizzy or lightheaded. It usually has no symptoms. But it does increase your risk of stroke, heart attack, and other problems. You and your doctor will talk about your risks of these problems based on your blood pressure. Your doctor will give you a goal for your blood pressure. Your goal will be based on your health and your age. Lifestyle changes, such as eating healthy and being active, are always important to help lower blood pressure. You might also take medicine to reach your blood pressure goal.  Follow-up care is a key part of your treatment and safety. Be sure to make and go to all appointments, and call your doctor if you are having problems. It's also a good idea to know your test results and keep a list of the medicines you take. How can you care for yourself at home? Medical treatment  · If you stop taking your medicine, your blood pressure will go back up. You may take one or more types of medicine to lower your blood pressure. Be safe with medicines. Take your medicine exactly as prescribed. Call your doctor if you think you are having a problem with your medicine. · Talk to your doctor before you start taking aspirin every day. Aspirin can help certain people lower their risk of a heart attack or stroke. But taking aspirin isn't right for everyone, because it can cause serious bleeding. · See your doctor regularly.  You may need to see the doctor more often at first or until your blood pressure comes down.  · If you are taking blood pressure medicine, talk to your doctor before you take decongestants or anti-inflammatory medicine, such as ibuprofen. Some of these medicines can raise blood pressure. · Learn how to check your blood pressure at home. Lifestyle changes  · Stay at a healthy weight. This is especially important if you put on weight around the waist. Losing even 10 pounds can help you lower your blood pressure. · If your doctor recommends it, get more exercise. Walking is a good choice. Bit by bit, increase the amount you walk every day. Try for at least 30 minutes on most days of the week. You also may want to swim, bike, or do other activities. · Avoid or limit alcohol. Talk to your doctor about whether you can drink any alcohol. · Try to limit how much sodium you eat to less than 2,300 milligrams (mg) a day. Your doctor may ask you to try to eat less than 1,500 mg a day. · Eat plenty of fruits (such as bananas and oranges), vegetables, legumes, whole grains, and low-fat dairy products. · Lower the amount of saturated fat in your diet. Saturated fat is found in animal products such as milk, cheese, and meat. Limiting these foods may help you lose weight and also lower your risk for heart disease. · Do not smoke. Smoking increases your risk for heart attack and stroke. If you need help quitting, talk to your doctor about stop-smoking programs and medicines. These can increase your chances of quitting for good. When should you call for help? Call 911 anytime you think you may need emergency care. This may mean having symptoms that suggest that your blood pressure is causing a serious heart or blood vessel problem. Your blood pressure may be over 180/120.   For example, call 911 if:    · You have symptoms of a heart attack. These may include:  ? Chest pain or pressure, or a strange feeling in the chest.  ? Sweating. ? Shortness of breath. ? Nausea or vomiting.   ? Pain, pressure, or a strange feeling in the back, neck, jaw, or upper belly or in one or both shoulders or arms. ? Lightheadedness or sudden weakness. ? A fast or irregular heartbeat.     · You have symptoms of a stroke. These may include:  ? Sudden numbness, tingling, weakness, or loss of movement in your face, arm, or leg, especially on only one side of your body. ? Sudden vision changes. ? Sudden trouble speaking. ? Sudden confusion or trouble understanding simple statements. ? Sudden problems with walking or balance. ? A sudden, severe headache that is different from past headaches.     · You have severe back or belly pain.    Do not wait until your blood pressure comes down on its own. Get help right away.   Call your doctor now or seek immediate care if:    · Your blood pressure is much higher than normal (such as 180/120 or higher), but you don't have symptoms.     · You think high blood pressure is causing symptoms, such as:  ? Severe headache.  ? Blurry vision.    Watch closely for changes in your health, and be sure to contact your doctor if:    · Your blood pressure measures higher than your doctor recommends at least 2 times. That means the top number is higher or the bottom number is higher, or both.     · You think you may be having side effects from your blood pressure medicine. Where can you learn more? Go to http://rene-raman.info/. Enter Z577 in the search box to learn more about \"High Blood Pressure: Care Instructions. \"  Current as of: July 22, 2018  Content Version: 11.9  © 7293-0334 [employer]. Care instructions adapted under license by [employer] (which disclaims liability or warranty for this information). If you have questions about a medical condition or this instruction, always ask your healthcare professional. [place] [address] any warranty or liability for your use of this information.          How to Read a Food Label to Limit Sodium: Care Instructions  Your Care Instructions  Sodium causes your body to hold on to extra water. This can raise your blood pressure and force your heart and kidneys to work harder. In very serious cases, this could cause you to be put in the hospital. It might even be life-threatening. By limiting sodium, you will feel better and lower your risk of serious problems. Processed foods, fast food, and restaurant foods are the major sources of dietary sodium. The most common name for sodium is salt. Try to limit how much sodium you eat to less than 2,300 milligrams (mg) a day. If you limit your sodium to 1,500 mg a day, you can lower your blood pressure even more. This limit counts all the salt that you eat in foods you cook or in packaged foods. Keep a list of everything you eat and drink. Follow-up care is a key part of your treatment and safety. Be sure to make and go to all appointments, and call your doctor if you are having problems. It's also a good idea to know your test results and keep a list of the medicines you take. How can you care for yourself at home? Read ingredient lists on food labels  · Read the list of ingredients on food labels to help you find how much sodium is in a food. The label lists the ingredients in a food in descending order (from the most to the least). If salt or sodium is high on the list, there may be a lot of sodium in the food. · Know that sodium has different names. Sodium is also called monosodium glutamate (MSG, common in [facility] food), sodium citrate, sodium alginate, sodium hydroxide, and sodium phosphate. Read Nutrition Facts labels  · On most foods, there is a Nutrition Facts label. This will tell you how much sodium is in one serving of food. Look at both the serving size and the sodium amount. The serving size is located at the top of the label, usually right under the \"Nutrition Facts\" title. The amount of sodium is given in the list under the title.  It is given in milligrams (mg).  ? Check the serving size carefully. A single serving is often very small, and you may eat more than one serving. If this is the case, you will eat more sodium than listed on the label. For example, if the serving size for a canned soup is 1 cup and the sodium amount is 470 mg, if you have 2 cups you will eat 940 mg of sodium. · The nutrition facts for fresh fruits and vegetables are not listed on the food. They may be listed somewhere in the store. These foods usually have no sodium or low sodium. · The Nutrition Facts label also gives you the Percent Daily Value for sodium. This is how much of the recommended amount of sodium a serving contains. The daily value for sodium is less than 2,300 mg. So if the Percent Daily Value says 50%, this means one serving is giving you half of this, or 1,150 mg. Buy low-sodium foods  · Look for foods that are made with less sodium. Watch for the following words on the label. ? \"Unsalted\" means there is no sodium added to the food. But there may be sodium already in the food naturally. ? \"Sodium-free\" means a serving has less than 5 mg of sodium. ? \"Very low sodium\" means a serving has 35 mg or less of sodium. ? \"Low-sodium\" means a serving has 140 mg or less of sodium. · \"Reduced-sodium\" means that there is 25% less sodium than what the food normally has. This is still usually too much sodium. Try not to buy foods with this on the label. · Buy fresh vegetables, or frozen vegetables without added sauces. Buy low-sodium versions of canned vegetables, soups, and other canned goods. Where can you learn more? Go to http://rene-raman.info/. Enter 26 606180 in the search box to learn more about \"How to Read a Food Label to Limit Sodium: Care Instructions. \"  Current as of: March 28, 2018  Content Version: 11.9  © 9715-1324 [employer].  Care instructions adapted under license by [employer] (which disclaims liability or warranty for this information). If you have questions about a medical condition or this instruction, always ask your healthcare professional. [place] [address] any warranty or liability for your use of this information.

## 2019-07-25 LAB
25(OH)D3+25(OH)D2 SERPL-MCNC: 33.7 NG/ML (ref 30–100)
ALBUMIN SERPL-MCNC: 4.9 G/DL (ref 3.5–5.5)
ALBUMIN/GLOB SERPL: 2 {RATIO} (ref 1.2–2.2)
ALP SERPL-CCNC: 61 IU/L (ref 39–117)
ALT SERPL-CCNC: 25 IU/L (ref 0–44)
AST SERPL-CCNC: 20 IU/L (ref 0–40)
BASOPHILS # BLD AUTO: 0.1 X10E3/UL (ref 0–0.2)
BASOPHILS NFR BLD AUTO: 1 %
BILIRUB SERPL-MCNC: 0.7 MG/DL (ref 0–1.2)
BUN SERPL-MCNC: 20 MG/DL (ref 6–24)
BUN/CREAT SERPL: 19 (ref 9–20)
CALCIUM SERPL-MCNC: 9.5 MG/DL (ref 8.7–10.2)
CHLORIDE SERPL-SCNC: 98 MMOL/L (ref 96–106)
CHOLEST SERPL-MCNC: 267 MG/DL (ref 100–199)
CO2 SERPL-SCNC: 26 MMOL/L (ref 20–29)
CREAT SERPL-MCNC: 1.06 MG/DL (ref 0.76–1.27)
EOSINOPHIL # BLD AUTO: 0.1 X10E3/UL (ref 0–0.4)
EOSINOPHIL NFR BLD AUTO: 1 %
ERYTHROCYTE [DISTWIDTH] IN BLOOD BY AUTOMATED COUNT: 12.1 % (ref 12.3–15.4)
GLOBULIN SER CALC-MCNC: 2.5 G/DL (ref 1.5–4.5)
GLUCOSE SERPL-MCNC: 88 MG/DL (ref 65–99)
HCT VFR BLD AUTO: 47.1 % (ref 37.5–51)
HDLC SERPL-MCNC: 60 MG/DL
HGB BLD-MCNC: 15.7 G/DL (ref 13–17.7)
IMM GRANULOCYTES # BLD AUTO: 0 X10E3/UL (ref 0–0.1)
IMM GRANULOCYTES NFR BLD AUTO: 0 %
INTERPRETATION, 910389: NORMAL
LDLC SERPL CALC-MCNC: 185 MG/DL (ref 0–99)
LYMPHOCYTES # BLD AUTO: 1.9 X10E3/UL (ref 0.7–3.1)
LYMPHOCYTES NFR BLD AUTO: 32 %
MCH RBC QN AUTO: 30.8 PG (ref 26.6–33)
MCHC RBC AUTO-ENTMCNC: 33.3 G/DL (ref 31.5–35.7)
MCV RBC AUTO: 93 FL (ref 79–97)
MONOCYTES # BLD AUTO: 0.6 X10E3/UL (ref 0.1–0.9)
MONOCYTES NFR BLD AUTO: 10 %
NEUTROPHILS # BLD AUTO: 3.3 X10E3/UL (ref 1.4–7)
NEUTROPHILS NFR BLD AUTO: 56 %
PLATELET # BLD AUTO: 206 X10E3/UL (ref 150–450)
POTASSIUM SERPL-SCNC: 4.9 MMOL/L (ref 3.5–5.2)
PROT SERPL-MCNC: 7.4 G/DL (ref 6–8.5)
RBC # BLD AUTO: 5.09 X10E6/UL (ref 4.14–5.8)
SODIUM SERPL-SCNC: 137 MMOL/L (ref 134–144)
TRIGL SERPL-MCNC: 109 MG/DL (ref 0–149)
VLDLC SERPL CALC-MCNC: 22 MG/DL (ref 5–40)
WBC # BLD AUTO: 5.9 X10E3/UL (ref 3.4–10.8)

## 2019-08-01 ENCOUNTER — OFFICE VISIT (OUTPATIENT)
Dept: FAMILY MEDICINE CLINIC | Age: 44
End: 2019-08-01

## 2019-08-01 VITALS
BODY MASS INDEX: 25.58 KG/M2 | RESPIRATION RATE: 14 BRPM | HEART RATE: 67 BPM | SYSTOLIC BLOOD PRESSURE: 109 MMHG | WEIGHT: 163 LBS | HEIGHT: 67 IN | OXYGEN SATURATION: 98 % | DIASTOLIC BLOOD PRESSURE: 75 MMHG | TEMPERATURE: 96.3 F

## 2019-08-01 DIAGNOSIS — J31.0 RHINITIS, UNSPECIFIED TYPE: ICD-10-CM

## 2019-08-01 DIAGNOSIS — I10 ESSENTIAL HYPERTENSION WITH GOAL BLOOD PRESSURE LESS THAN 140/90: Primary | ICD-10-CM

## 2019-08-01 DIAGNOSIS — E78.5 HYPERLIPIDEMIA, UNSPECIFIED HYPERLIPIDEMIA TYPE: ICD-10-CM

## 2019-08-01 DIAGNOSIS — E55.9 VITAMIN D DEFICIENCY: ICD-10-CM

## 2019-08-01 RX ORDER — FLUTICASONE PROPIONATE 50 MCG
2 SPRAY, SUSPENSION (ML) NASAL DAILY
Qty: 1 BOTTLE | Refills: 6 | Status: SHIPPED | OUTPATIENT
Start: 2019-08-01

## 2019-08-01 NOTE — PROGRESS NOTES
1. Have you been to the ER, urgent care clinic since your last visit? Hospitalized since your last visit? 2. Have you seen or consulted any other health care providers outside of the [address] [place] [address] since your last visit? Include any pap smears or colon screening.  No

## 2019-08-01 NOTE — PROGRESS NOTES
[facility]    CC: F/U for chronic disease management    HPI:     HLD:  -Got requested blood work  -On no medication  -Exercises 4 days a week for 30-60 min  -Has improved diet by reducing sugar and caffeine. Trying to eat healthy w/ moderate portions      HTN:  Got requested blood work  -Taking BP medications prescribed  -Denies any side effects or issues the medication  -Checks BP at work  -BP range has been in the 100s-130s/60s-80s  -Has improved diet by reducing sugar and caffeine. Trying to eat healthy w/ moderate portions        Vitamin D deficiency:  -Got requested blood work  -Taking vitamin D supplement  -Denies any effects or issues with supplement    ROS: Positive items marked in RED  CON: fever, chills  Cardiovascular: palpitations, CP  Resp: SOB, cough  GI: nausea, vomiting, diarrhea  : dysuria, hematuria    Past Medical History:   Diagnosis Date    Biceps tendon rupture, right, initial encounter 2017    Dyslipidemia     Hypertension        Past Surgical History:   Procedure Laterality Date    HX HERNIA REPAIR  1976    HX ORTHOPAEDIC Right 2017    Tendon rupture arm    HX TONSIL AND ADENOIDECTOMY  1985    HX WISDOM TEETH EXTRACTION  1992       Family History   Problem Relation Age of Onset    Diabetes Father     Hypertension Father     High Cholesterol Father        Social History     Socioeconomic History    Marital status:      Spouse name: Not on file    Number of children: Not on file    Years of education: Not on file    Highest education level: Not on file   Occupational History    Occupation: RN     Comment: Occupational nurse for [employer]   Tobacco Use    Smoking status: Never Smoker    Smokeless tobacco: Never Used   Substance and Sexual Activity    Alcohol use:  Yes     Alcohol/week: 2.0 standard drinks     Types: 2 Cans of beer per week    Drug use: No    Sexual activity: Yes     Partners: Female     Birth control/protection: None       No Known Allergies      Current Outpatient Medications:     lisinopril (PRINIVIL, ZESTRIL) 20 mg tablet, Take 1 Tab by mouth daily. Indications: high blood pressure, Disp: 90 Tab, Rfl: 1    cholecalciferol (VITAMIN D3) 1,000 unit cap, Take  by mouth daily. , Disp: , Rfl:     Physical Exam:      /75   Pulse 67   Temp 96.3 °F (35.7 °C) (Oral)   Resp 14   Ht 5' 7\" (1.702 m)   Wt 163 lb (73.9 kg)   SpO2 98%   BMI 25.53 kg/m²     General:  WD, WN, NAD, conversant  Eyes: sclera clear bilaterally, no discharge noted, eyelids normal in appearance  HENT: NCAT  Lungs: CTAB, normal respiratory effort and rate  CV: RRR, no MRGs  ABD: soft, non-tender, non-distended, normal bowel sounds  Skin: normal temperature, turgor, color, and texture  Psych: alert and oriented to person, place and situation, normal affect  Neuro: speech normal, moving all extremities, gait normal    Results for Rhianna Rodriguez (MRN [id number]):   Ref. Range 7/24/2019 00:00   WBC Latest Ref Range: 3.4 - 10.8 x10E3/uL 5.9   RBC Latest Ref Range: 4.14 - 5.80 x10E6/uL 5.09   HGB Latest Ref Range: 13.0 - 17.7 g/dL 15.7   HCT Latest Ref Range: 37.5 - 51.0 % 47.1   MCV Latest Ref Range: 79 - 97 fL 93   MCH Latest Ref Range: 26.6 - 33.0 pg 30.8   MCHC Latest Ref Range: 31.5 - 35.7 g/dL 33.3   RDW Latest Ref Range: 12.3 - 15.4 % 12.1 (L)   PLATELET Latest Ref Range: 150 - 450 x10E3/uL 206   NEUTROPHILS Latest Ref Range: Not Estab. % 56   Lymphocytes Latest Ref Range: Not Estab. % 32   MONOCYTES Latest Ref Range: Not Estab. % 10   EOSINOPHILS Latest Ref Range: Not Estab. % 1   BASOPHILS Latest Ref Range: Not Estab. % 1   IMMATURE GRANULOCYTES Latest Ref Range: Not Estab. % 0   ABS. NEUTROPHILS Latest Ref Range: 1.4 - 7.0 x10E3/uL 3.3   ABS. IMM. GRANS. Latest Ref Range: 0.0 - 0.1 x10E3/uL 0.0   Abs Lymphocytes Latest Ref Range: 0.7 - 3.1 x10E3/uL 1.9   ABS. MONOCYTES Latest Ref Range: 0.1 - 0.9 x10E3/uL 0.6   ABS.  EOSINOPHILS Latest Ref Range: 0.0 - 0.4 x10E3/uL 0.1   ABS. BASOPHILS Latest Ref Range: 0.0 - 0.2 x10E3/uL 0.1   Sodium Latest Ref Range: 134 - 144 mmol/L 137   Potassium Latest Ref Range: 3.5 - 5.2 mmol/L 4.9   Chloride Latest Ref Range: 96 - 106 mmol/L 98   CO2 Latest Ref Range: 20 - 29 mmol/L 26   Glucose Latest Ref Range: 65 - 99 mg/dL 88   BUN Latest Ref Range: 6 - 24 mg/dL 20   Creatinine Latest Ref Range: 0.76 - 1.27 mg/dL 1.06   BUN/Creatinine ratio Latest Ref Range: 9 - 20  19   Calcium Latest Ref Range: 8.7 - 10.2 mg/dL 9.5   GFR est non-AA Latest Ref Range: >59 mL/min/1.73 86   GFR est AA Latest Ref Range: >59 mL/min/1.73 100   Bilirubin, total Latest Ref Range: 0.0 - 1.2 mg/dL 0.7   Protein, total Latest Ref Range: 6.0 - 8.5 g/dL 7.4   Albumin Latest Ref Range: 3.5 - 5.5 g/dL 4.9   A-G Ratio Latest Ref Range: 1.2 - 2.2  2.0   ALT (SGPT) Latest Ref Range: 0 - 44 IU/L 25   AST Latest Ref Range: 0 - 40 IU/L 20   Alk.  phosphatase Latest Ref Range: 39 - 117 IU/L 61   Triglyceride Latest Ref Range: 0 - 149 mg/dL 109   Cholesterol, total Latest Ref Range: 100 - 199 mg/dL 267 (H)   HDL Cholesterol Latest Ref Range: >39 mg/dL 60   LDL, calculated Latest Ref Range: 0 - 99 mg/dL 185 (H)   VLDL, calculated Latest Ref Range: 5 - 40 mg/dL 22   VITAMIN D, 25-HYDROXY Latest Ref Range: 30.0 - 100.0 ng/mL 33.7       Assessment/Plan     Hypertension, well controlled:  -10-year ASCVD risk of 1.9%  -BP at goal of less than 140/90  -Follow-up in 6 months for chronic disease management      Hyperlipidemia:  -10-year ASCVD risk of 1.9%  -Patient counseled on ASCVD risk and AHA/ACC recommendations  -Will continue to manage with lifestyle changes  -Follow-up in 6 months for chronic disease management      Vitamin D deficiency, well controlled:  -Vitamin D level in low end of normal  -Will continue over-the-counter vitamin D  -Follow-up in 6 months for chronic disease management        Vanessa Givens MD  8/1/2019, 8:26 AM

## 2019-11-15 ENCOUNTER — HOSPITAL ENCOUNTER (OUTPATIENT)
Dept: LAB | Age: 44
Discharge: HOME | End: 2019-11-15
Payer: OTHER GOVERNMENT

## 2019-11-15 ENCOUNTER — OFFICE VISIT (OUTPATIENT)
Dept: FAMILY MEDICINE CLINIC | Age: 44
End: 2019-11-15

## 2019-11-15 VITALS
RESPIRATION RATE: 16 BRPM | HEIGHT: 67 IN | SYSTOLIC BLOOD PRESSURE: 144 MMHG | DIASTOLIC BLOOD PRESSURE: 79 MMHG | WEIGHT: 165 LBS | HEART RATE: 64 BPM | TEMPERATURE: 97 F | BODY MASS INDEX: 25.9 KG/M2 | OXYGEN SATURATION: 98 %

## 2019-11-15 DIAGNOSIS — I10 ESSENTIAL HYPERTENSION: Primary | ICD-10-CM

## 2019-11-15 DIAGNOSIS — Z23 ENCOUNTER FOR IMMUNIZATION: ICD-10-CM

## 2019-11-15 DIAGNOSIS — Z02.0 SCHOOL PHYSICAL EXAM: ICD-10-CM

## 2019-11-15 PROCEDURE — 86765 RUBEOLA ANTIBODY: CPT

## 2019-11-15 PROCEDURE — 36415 COLL VENOUS BLD VENIPUNCTURE: CPT

## 2019-11-15 PROCEDURE — 86787 VARICELLA-ZOSTER ANTIBODY: CPT

## 2019-11-15 PROCEDURE — 86706 HEP B SURFACE ANTIBODY: CPT

## 2019-11-15 PROCEDURE — 86762 RUBELLA ANTIBODY: CPT

## 2019-11-15 PROCEDURE — 86735 MUMPS ANTIBODY: CPT

## 2019-11-15 NOTE — PROGRESS NOTES
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 Have you been to the ER, urgent care clinic since your last visit? Hospitalized since your last visit? No    2. Have you seen or consulted any other health care providers outside of the [address] [place] [address] since your last visit? Include any pap smears or colon screening.  No

## 2019-11-16 LAB
MEV IGG SER IA-ACNC: 125 AU/ML
MUV IGG SER IA-ACNC: 65.8 AU/ML
VZV IGG SER IA-ACNC: 2760 INDEX

## 2019-11-18 LAB
HBV SURFACE AB SER QL IA: POSITIVE
HBV SURFACE AB SERPL IA-ACNC: 277.63 MIU/ML
HEP BS AB COMMENT,HBSAC: NORMAL
RUBV IGG SER-IMP: NORMAL

## 2019-11-29 NOTE — PROGRESS NOTES
Melvin Ashraf. [facility] 87      Dearl Caitlin is a 38 y.o. 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 Essential hypertension  -     VZV AB, IGG; Future  -     RUBEOLA AB, IGG; Future  -     MUMPS AB, IGG; Future  -     RUBELLA AB, IGG; Future  -     HEP B SURFACE AB; Future  Blood pressure elevated, encouraged low sodium diet  No medication changes  He will f/u with Dr. Lisbet Lee    2. Encounter for immunization  -     TETANUS, DIPHTHERIA TOXOIDS AND ACELLULAR PERTUSSIS VACCINE (TDAP), IN INDIVIDS. >=7, IM  -     THER/PROPH/DIAG INJECTION, SUBCUT/IM    Titers ordered  Health maintenance need for vaccination administered today    This note was dictated using Dragon dictation system. Every effort was made to ensure accuracy, although occasional spelling errors and word substitutions are expected due to dictation system limitations. Thank you for your understanding and patience. Follow-up and Dispositions    · Return if symptoms worsen or fail to improve. 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 yes   Medication side effects noted. no  Headache upon wakening. no   Home BP monitoring in range of does not check's systolic. Do you experience chest pain/pressure or SOB with exertion? no  Key CAD CHF Meds             lisinopril (PRINIVIL, ZESTRIL) 20 mg tablet (Taking/Discontinued) Take 1 Tab by mouth daily. Indications: high blood pressure          ROS:     ROS    The problem list was updated as a part of today's visit. Patient Active Problem List   Diagnosis Code    Hypertension I10       The PSH, FH were reviewed.       SH:  Social History     Tobacco Use    Smoking status: Never Smoker    Smokeless tobacco: Never Used   Substance Use Topics    Alcohol use: Yes     Alcohol/week: 2.0 standard drinks     Types: 2 Cans of beer per week     Comment: occasionally     Drug use: No       Medications/Allergies:  Current Outpatient Medications on File Prior to Visit   Medication Sig Dispense Refill    fluticasone propionate (FLONASE) 50 mcg/actuation nasal spray 2 Sprays by Both Nostrils route daily. 1 Bottle 6    cholecalciferol (VITAMIN D3) 1,000 unit cap Take  by mouth daily. No current facility-administered medications on file prior to visit. No Known Allergies    Objective:  Visit Vitals  /79   Pulse 64   Temp 97 °F (36.1 °C) (Oral)   Resp 16   Ht 5' 7\" (1.702 m)   Wt 165 lb (74.8 kg)   SpO2 98%   BMI 25.84 kg/m²    Body mass index is 25.84 kg/m². Physical assessment  Physical Exam  Vitals signs and nursing note reviewed. Eyes:      Conjunctiva/sclera: Conjunctivae normal.      Pupils: Pupils are equal, round, and reactive to light. Neck:      Musculoskeletal: Normal range of motion. Vascular: No JVD. Cardiovascular:      Rate and Rhythm: Normal rate and regular rhythm. Heart sounds: Normal heart sounds. No murmur. No friction rub. No gallop. Pulmonary:      Effort: Pulmonary effort is normal.      Breath sounds: Normal breath sounds. Musculoskeletal: Normal range of motion. Skin:     General: Skin is warm and dry. Neurological:      Mental Status: He is alert and oriented to person, place, and time.    Psychiatric:         Judgment: Judgment normal.           Labwork and Ancillary Studies:    CBC w/Diff  Lab Results   Component Value Date/Time    WBC 5.9 07/24/2019 12:00 AM    HGB 15.7 07/24/2019 12:00 AM    PLATELET 954 15/23/3848 12:00 AM         Basic Metabolic Profile  Lab Results   Component Value Date/Time    Sodium 137 07/24/2019 12:00 AM    Potassium 4.9 07/24/2019 12:00 AM    Chloride 98 07/24/2019 12:00 AM    CO2 26 07/24/2019 12:00 AM    Anion gap 10 11/19/2018 01:45 PM    Glucose 88 07/24/2019 12:00 AM    BUN 20 07/24/2019 12:00 AM    Creatinine 1.06 07/24/2019 12:00 AM    BUN/Creatinine ratio 19 07/24/2019 12:00 AM    GFR est  07/24/2019 12:00 AM    GFR est non-AA 86 07/24/2019 12:00 AM    Calcium 9.5 07/24/2019 12:00 AM        Cholesterol  Lab Results   Component Value Date/Time    Cholesterol, total 267 (H) 07/24/2019 12:00 AM    HDL Cholesterol 60 07/24/2019 12:00 AM    LDL, calculated 185 (H) 07/24/2019 12:00 AM    Triglyceride 109 07/24/2019 12:00 AM    CHOL/HDL Ratio 4.3 11/19/2018 01:45 PM       Health Maintenance:   Health Maintenance   Topic Date Due    Influenza Age 5 to Adult  08/01/2020    Lipid Screen  07/24/2024    DTaP/Tdap/Td series (3 - Td) 11/15/2029    Pneumococcal 0-64 years  Aged Out       I have discussed the diagnosis with the patient and the intended plan as seen in the above orders. The patient has received an After-Visit Summary and questions were answered concerning future plans. An After Visit Summary was printed and given to the patient. All diagnosis have been discussed with the patient and all of the patient's questions have been answered. Follow-up and Dispositions    · Return if symptoms worsen or fail to improve. Rhonda Barlow, [facility]  [address] [facility]   [address] [facility] [address]  [id number]

## 2019-12-12 ENCOUNTER — CLINICAL SUPPORT (OUTPATIENT)
Dept: FAMILY MEDICINE CLINIC | Age: 44
End: 2019-12-12

## 2019-12-12 VITALS
OXYGEN SATURATION: 97 % | DIASTOLIC BLOOD PRESSURE: 79 MMHG | BODY MASS INDEX: 26.53 KG/M2 | HEART RATE: 78 BPM | WEIGHT: 169 LBS | HEIGHT: 67 IN | SYSTOLIC BLOOD PRESSURE: 114 MMHG | RESPIRATION RATE: 14 BRPM

## 2019-12-12 DIAGNOSIS — Z01.84 IMMUNITY STATUS TESTING: Primary | ICD-10-CM

## 2019-12-12 NOTE — PROGRESS NOTES
Chief Complaint   Patient presents with    Other     paperwork        1. Have you been to the ER, urgent care clinic since your last visit? Hospitalized since your last visit? No    2. Have you seen or consulted any other health care providers outside of the [address] [place] [address] since your last visit? Include any pap smears or colon screening.  No

## 2020-02-03 ENCOUNTER — OFFICE VISIT (OUTPATIENT)
Dept: FAMILY MEDICINE CLINIC | Age: 45
End: 2020-02-03

## 2020-02-03 VITALS
HEIGHT: 67 IN | WEIGHT: 164 LBS | BODY MASS INDEX: 25.74 KG/M2 | HEART RATE: 66 BPM | SYSTOLIC BLOOD PRESSURE: 136 MMHG | OXYGEN SATURATION: 97 % | DIASTOLIC BLOOD PRESSURE: 84 MMHG | TEMPERATURE: 97.5 F

## 2020-02-03 DIAGNOSIS — E55.9 VITAMIN D DEFICIENCY: ICD-10-CM

## 2020-02-03 DIAGNOSIS — I10 ESSENTIAL HYPERTENSION WITH GOAL BLOOD PRESSURE LESS THAN 140/90: Primary | ICD-10-CM

## 2020-02-03 DIAGNOSIS — E78.5 HYPERLIPIDEMIA, UNSPECIFIED HYPERLIPIDEMIA TYPE: ICD-10-CM

## 2020-02-03 NOTE — PROGRESS NOTES
1. Have you been to the ER, urgent care clinic since your last visit? Hospitalized since your last visit? No    2. Have you seen or consulted any other health care providers outside of the [address] [place] [address] since your last visit? Include any pap smears or colon screening.  No

## 2020-02-03 NOTE — PROGRESS NOTES
Sandeep Gonzales    CC: Follow-up for chronic disease management    HPI:     Hypertension:  -Taking BP medication as prescribed  -Denies any side effects or issues with BP medication  -Checks BP at work  -Reports BP has been in the 120s/70s  -Exercises 3 days a week for ~60 minutes  -Trying to eat whole foods with no preservative       Hyperlipidemia:  -On no lipid-lowering medication  -Exercises 3 days a week for ~60 minutes  -Trying to eat whole foods with no preservative      Vitamin D Deficiency:  -Taking OTC vitamin D supplement  -Denies any side effects or issues with vitamin D supplement      ROS: Positive items marked in RED  CON: fever, chills  Cardiovascular: palpitations, CP  Resp: SOB, cough  GI: nausea, vomiting, diarrhea  : dysuria, hematuria      Past Medical History:   Diagnosis Date    Biceps tendon rupture, right, initial encounter 2017    Dyslipidemia     Hypertension        Past Surgical History:   Procedure Laterality Date    HX HERNIA REPAIR  1976    HX ORTHOPAEDIC Right 2017    Tendon rupture arm    HX TONSIL AND ADENOIDECTOMY  1985    HX WISDOM TEETH EXTRACTION  1992       Family History   Problem Relation Age of Onset    Diabetes Father     Hypertension Father     High Cholesterol Father        Social History     Socioeconomic History    Marital status:      Spouse name: Not on file    Number of children: Not on file    Years of education: Not on file    Highest education level: Not on file   Occupational History    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 , Disp: 1 Bottle, Rfl: 6    lisinopril (PRINIVIL, ZESTRIL) 20 mg tablet, Take 1 Tab by mouth daily. Indications: high blood pressure, Disp: 90 Tab, Rfl: 1    cholecalciferol (VITAMIN D3) 1,000 unit cap, Take  by mouth daily. , Disp: , Rfl:     Physical Exam:      /84   Pulse 66   Temp 97.5 °F (36.4 °C) (Oral)   Ht 5' 7\" (1.702 m)   Wt 164 lb (74.4 kg)   SpO2 97%   BMI 25.69 kg/m²     General:  WD, WN, NAD, conversant  Eyes: sclera clear bilaterally, no discharge noted, eyelids normal in appearance  HENT: NCAT  Lungs: CTAB, normal respiratory effort and rate  CV: RRR, no MRGs  ABD: soft, non-tender, non-distended, normal bowel sounds  Skin: normal temperature, turgor, color, and texture  Psych: alert and oriented to person, place and situation, normal affect  Neuro: speech normal, moving all extremities, gait normal      Assessment/Plan     Hypertension, well controlled:  -Last 10-year ASCVD risk calculated to be 1.9%  -BP at goal of less than 140/90  -We will continue current BP medication regimen  -CBC, CMP, and fasting lipid panel ordered  -Follow-up in 6 months for well visit        Hyperlipidemia:  -Last 10-year ASCVD risk calculated to be 1.9%  -Will continue to manage with lifestyle changes  -CMP and fasting lipid panel ordered  -Follow-up in 6 months for well visit        Vitamin D Deficiency:  -Vitamin D level ordered  -Will continue over-the-counter vitamin D  -Follow-up in 6 months for well visit        Paige Schultz MD  2/3/2020, 8:08 AM

## 2020-04-27 DIAGNOSIS — I10 ESSENTIAL HYPERTENSION: ICD-10-CM

## 2020-04-27 RX ORDER — LISINOPRIL 20 MG/1
TABLET ORAL
Qty: 90 TAB | Refills: 1 | Status: SHIPPED | OUTPATIENT
Start: 2020-04-27